# Patient Record
Sex: MALE | URBAN - METROPOLITAN AREA
[De-identification: names, ages, dates, MRNs, and addresses within clinical notes are randomized per-mention and may not be internally consistent; named-entity substitution may affect disease eponyms.]

---

## 2024-02-20 ENCOUNTER — OFFICE VISIT (OUTPATIENT)
Age: 19
End: 2024-02-20
Payer: COMMERCIAL

## 2024-02-20 ENCOUNTER — PREP FOR PROCEDURE (OUTPATIENT)
Age: 19
End: 2024-02-20

## 2024-02-20 VITALS
TEMPERATURE: 98.1 F | HEIGHT: 71 IN | RESPIRATION RATE: 18 BRPM | DIASTOLIC BLOOD PRESSURE: 72 MMHG | OXYGEN SATURATION: 97 % | WEIGHT: 217.6 LBS | HEART RATE: 95 BPM | BODY MASS INDEX: 30.46 KG/M2 | SYSTOLIC BLOOD PRESSURE: 133 MMHG

## 2024-02-20 DIAGNOSIS — N50.89 PERINEAL MASS IN MALE: Primary | ICD-10-CM

## 2024-02-20 DIAGNOSIS — N50.89 PERINEAL MASS, MALE: ICD-10-CM

## 2024-02-20 PROCEDURE — 99203 OFFICE O/P NEW LOW 30 MIN: CPT | Performed by: SURGERY

## 2024-02-20 ASSESSMENT — PATIENT HEALTH QUESTIONNAIRE - PHQ9
SUM OF ALL RESPONSES TO PHQ QUESTIONS 1-9: 0
SUM OF ALL RESPONSES TO PHQ9 QUESTIONS 1 & 2: 0
1. LITTLE INTEREST OR PLEASURE IN DOING THINGS: 0
2. FEELING DOWN, DEPRESSED OR HOPELESS: 0

## 2024-02-20 NOTE — PROGRESS NOTES
Subjective:       Tenzin Wilkinson is a 19 y.o. male who presents for evaluate a lump in his right groin. He has discomfort when he moves. He has had the pain since last June. He only recently noticed it. [facility] He was referred to urology and was referred here.     2/12/24 CT Pelvis with IV contrast:  3.4 cm right anterior perineum subcutaneous fat  Past Medical History:   Diagnosis Date    Cystic acne     Pt went through Accutane    Environmental allergies      Past Surgical History:   Procedure Laterality Date    WISDOM TOOTH EXTRACTION       Social History     Socioeconomic History    Marital status: Unknown     Spouse name: None    Number of children: None    Years of education: None    Highest education level: None   Tobacco Use    Smoking status: Never     Passive exposure: Never    Smokeless tobacco: Never   Vaping Use    Vaping Use: Never used   Substance and Sexual Activity    Alcohol use: Never    Drug use: Never     No current outpatient medications on file.     No current facility-administered medications for this visit.     No Known Allergies    Review of Systems  Pertinent items are noted in HPI.      Objective:      /72 (Site: Left Upper Arm, Position: Sitting, Cuff Size: Small Adult)   Pulse 95   Temp 98.1 °F (36.7 °C) (Temporal)   Resp 18   Ht 1.803 m (5' 11\")   Wt 98.7 kg (217 lb 9.6 oz)   SpO2 97%   BMI 30.35 kg/m²     Physical Exam:  General:  Alert, cooperative, no distress, appears stated age.   Eyes:  Conjunctivae/corneas clear.    Ears:  Normal external ear canals both ears.   Nose: Nares normal. Septum midline.    Mouth/Throat: Lips, mucosa, and tongue normal. Teeth and gums normal.   Neck: Supple, symmetrical, trachea midline   Lungs:   Clear to auscultation bilaterally. No respiratory distress   Heart:  Regular rate and rhythm   Abdomen:   Soft, non-tender. Bowel sounds normal. No masses,  No organomegaly.   Extremities: Extremities normal, atraumatic, no cyanosis

## 2024-02-20 NOTE — PROGRESS NOTES
Identified pt with two pt identifiers (name and ). Reviewed chart in preparation for visit and have obtained necessary documentation.    Tenzin Wilkinson is a 19 y.o. male  Chief Complaint   Patient presents with    New Patient     Seen at request of Dr. Zander Fajardo, eval mass on Perineum      /72 (Site: Left Upper Arm, Position: Sitting, Cuff Size: Small Adult)   Pulse 95   Temp 98.1 °F (36.7 °C) (Temporal)   Resp 18   Ht 1.803 m (5' 11\")   Wt 98.7 kg (217 lb 9.6 oz)   SpO2 97%   BMI 30.35 kg/m²     1. Have you been to the ER, urgent care clinic since your last visit?  Hospitalized since your last visit?no    2. Have you seen or consulted any other health care providers outside of the [facility] since your last visit?  Include any pap smears or colon screening. no

## 2024-03-07 NOTE — PROGRESS NOTES
your hair loose or down, no ponytails, buns, александр pins or clips.  All body piercings must be removed.  Please shower with antibacterial soap for three consecutive days before and on the morning of surgery, but do not apply any lotions, powders or deodorants after the shower on the day of surgery. Please use a fresh towels after each shower. Please sleep in clean clothes and change bed linens the night before surgery.  Please do not shave for 48 hours prior to surgery. Shaving of the face is acceptable.    7. You should understand that if you do not follow these instructions your surgery may be cancelled.  If your physical condition changes (I.e. fever, cold or flu) please contact your surgeon as soon as possible.    8. It is important that you be on time.  If a situation occurs where you may be late, please call [phone] (OR Holding Area).    9. If you have any questions and or problems, please call [phone] (Pre-admission Testing).    10. Your surgery time may be subject to change.  You will receive a phone call the evening prior if your time changes.    11.  If having outpatient surgery, you must have someone to drive you here, stay with you during the duration of your stay, and to drive you home at time of discharge.       TAKE ALL MEDICATIONS DAY OF SURGERY EXCEPT: none      I understand a pre-operative phone call will be made to verify my surgery time.  In the event that I am not available, I give permission for a message to be left on my answering service and/or with another person?  yes         ___________________      __________   _________    (Signature of Patient)             (Witness)                (Date and Time)

## 2024-03-11 ENCOUNTER — ANESTHESIA EVENT (OUTPATIENT)
Facility: HOSPITAL | Age: 19
End: 2024-03-11
Payer: COMMERCIAL

## 2024-03-11 ENCOUNTER — ANESTHESIA (OUTPATIENT)
Facility: HOSPITAL | Age: 19
End: 2024-03-11
Payer: COMMERCIAL

## 2024-03-11 ENCOUNTER — HOSPITAL ENCOUNTER (OUTPATIENT)
Facility: HOSPITAL | Age: 19
Setting detail: OUTPATIENT SURGERY
Discharge: HOME | End: 2024-03-11
Attending: SURGERY | Admitting: SURGERY
Payer: COMMERCIAL

## 2024-03-11 VITALS
WEIGHT: 214.51 LBS | SYSTOLIC BLOOD PRESSURE: 119 MMHG | HEART RATE: 71 BPM | DIASTOLIC BLOOD PRESSURE: 66 MMHG | RESPIRATION RATE: 15 BRPM | OXYGEN SATURATION: 97 % | HEIGHT: 71 IN | TEMPERATURE: 98 F | BODY MASS INDEX: 30.03 KG/M2

## 2024-03-11 DIAGNOSIS — N50.89 PERINEAL MASS, MALE: Primary | ICD-10-CM

## 2024-03-11 PROCEDURE — 7100000001 HC PACU RECOVERY - ADDTL 15 MIN: Performed by: SURGERY

## 2024-03-11 PROCEDURE — 3600000012 HC SURGERY LEVEL 2 ADDTL 15MIN: Performed by: SURGERY

## 2024-03-11 PROCEDURE — 27043 EXC HIP PELVIS LES SC 3 CM/>: CPT | Performed by: SURGERY

## 2024-03-11 PROCEDURE — 6370000000 HC RX 637 (ALT 250 FOR IP): Performed by: ANESTHESIOLOGY

## 2024-03-11 PROCEDURE — 7100000000 HC PACU RECOVERY - FIRST 15 MIN: Performed by: SURGERY

## 2024-03-11 PROCEDURE — 88185 FLOWCYTOMETRY/TC ADD-ON: CPT

## 2024-03-11 PROCEDURE — 6360000002 HC RX W HCPCS: Performed by: SURGERY

## 2024-03-11 PROCEDURE — 88305 TISSUE EXAM BY PATHOLOGIST: CPT

## 2024-03-11 PROCEDURE — 2709999900 HC NON-CHARGEABLE SUPPLY: Performed by: SURGERY

## 2024-03-11 PROCEDURE — 3700000000 HC ANESTHESIA ATTENDED CARE: Performed by: SURGERY

## 2024-03-11 PROCEDURE — 2580000003 HC RX 258: Performed by: STUDENT IN AN ORGANIZED HEALTH CARE EDUCATION/TRAINING PROGRAM

## 2024-03-11 PROCEDURE — 3700000001 HC ADD 15 MINUTES (ANESTHESIA): Performed by: SURGERY

## 2024-03-11 PROCEDURE — 2580000003 HC RX 258: Performed by: SURGERY

## 2024-03-11 PROCEDURE — 88342 IMHCHEM/IMCYTCHM 1ST ANTB: CPT

## 2024-03-11 PROCEDURE — 2500000003 HC RX 250 WO HCPCS

## 2024-03-11 PROCEDURE — 88184 FLOWCYTOMETRY/ TC 1 MARKER: CPT

## 2024-03-11 PROCEDURE — 3600000002 HC SURGERY LEVEL 2 BASE: Performed by: SURGERY

## 2024-03-11 PROCEDURE — 6360000002 HC RX W HCPCS

## 2024-03-11 PROCEDURE — 88341 IMHCHEM/IMCYTCHM EA ADD ANTB: CPT

## 2024-03-11 RX ORDER — ONDANSETRON 2 MG/ML
4 INJECTION INTRAMUSCULAR; INTRAVENOUS ONCE
Status: DISCONTINUED | OUTPATIENT
Start: 2024-03-11 | End: 2024-03-11 | Stop reason: HOSPADM

## 2024-03-11 RX ORDER — SODIUM CHLORIDE 0.9 % (FLUSH) 0.9 %
5-40 SYRINGE (ML) INJECTION EVERY 12 HOURS SCHEDULED
Status: DISCONTINUED | OUTPATIENT
Start: 2024-03-11 | End: 2024-03-11 | Stop reason: HOSPADM

## 2024-03-11 RX ORDER — PROCHLORPERAZINE EDISYLATE 5 MG/ML
5 INJECTION INTRAMUSCULAR; INTRAVENOUS
Status: DISCONTINUED | OUTPATIENT
Start: 2024-03-11 | End: 2024-03-11 | Stop reason: HOSPADM

## 2024-03-11 RX ORDER — MIDAZOLAM HYDROCHLORIDE 1 MG/ML
INJECTION INTRAMUSCULAR; INTRAVENOUS PRN
Status: DISCONTINUED | OUTPATIENT
Start: 2024-03-11 | End: 2024-03-11 | Stop reason: SDUPTHER

## 2024-03-11 RX ORDER — SODIUM CHLORIDE 0.9 % (FLUSH) 0.9 %
5-40 SYRINGE (ML) INJECTION PRN
Status: DISCONTINUED | OUTPATIENT
Start: 2024-03-11 | End: 2024-03-11 | Stop reason: HOSPADM

## 2024-03-11 RX ORDER — OXYCODONE HYDROCHLORIDE 5 MG/1
5 TABLET ORAL
Status: DISCONTINUED | OUTPATIENT
Start: 2024-03-11 | End: 2024-03-11 | Stop reason: HOSPADM

## 2024-03-11 RX ORDER — SODIUM CHLORIDE, SODIUM LACTATE, POTASSIUM CHLORIDE, CALCIUM CHLORIDE 600; 310; 30; 20 MG/100ML; MG/100ML; MG/100ML; MG/100ML
INJECTION, SOLUTION INTRAVENOUS CONTINUOUS
Status: DISCONTINUED | OUTPATIENT
Start: 2024-03-11 | End: 2024-03-11 | Stop reason: HOSPADM

## 2024-03-11 RX ORDER — ACETAMINOPHEN 500 MG
1000 TABLET ORAL ONCE
Status: COMPLETED | OUTPATIENT
Start: 2024-03-11 | End: 2024-03-11

## 2024-03-11 RX ORDER — OXYCODONE HYDROCHLORIDE 5 MG/1
5 TABLET ORAL EVERY 6 HOURS PRN
Qty: 12 TABLET | Refills: 0 | Status: SHIPPED | OUTPATIENT
Start: 2024-03-11 | End: 2024-03-14

## 2024-03-11 RX ORDER — SODIUM CHLORIDE 9 MG/ML
INJECTION, SOLUTION INTRAVENOUS PRN
Status: DISCONTINUED | OUTPATIENT
Start: 2024-03-11 | End: 2024-03-11 | Stop reason: HOSPADM

## 2024-03-11 RX ORDER — ONDANSETRON 2 MG/ML
INJECTION INTRAMUSCULAR; INTRAVENOUS PRN
Status: DISCONTINUED | OUTPATIENT
Start: 2024-03-11 | End: 2024-03-11 | Stop reason: SDUPTHER

## 2024-03-11 RX ORDER — DEXAMETHASONE SODIUM PHOSPHATE 4 MG/ML
4 INJECTION, SOLUTION INTRA-ARTICULAR; INTRALESIONAL; INTRAMUSCULAR; INTRAVENOUS; SOFT TISSUE
Status: DISCONTINUED | OUTPATIENT
Start: 2024-03-11 | End: 2024-03-11 | Stop reason: HOSPADM

## 2024-03-11 RX ORDER — LIDOCAINE HYDROCHLORIDE 20 MG/ML
INJECTION, SOLUTION EPIDURAL; INFILTRATION; INTRACAUDAL; PERINEURAL PRN
Status: DISCONTINUED | OUTPATIENT
Start: 2024-03-11 | End: 2024-03-11 | Stop reason: SDUPTHER

## 2024-03-11 RX ORDER — ACETAMINOPHEN 325 MG/1
650 TABLET ORAL
Status: DISCONTINUED | OUTPATIENT
Start: 2024-03-11 | End: 2024-03-11 | Stop reason: HOSPADM

## 2024-03-11 RX ORDER — NALOXONE HYDROCHLORIDE 0.4 MG/ML
INJECTION, SOLUTION INTRAMUSCULAR; INTRAVENOUS; SUBCUTANEOUS PRN
Status: DISCONTINUED | OUTPATIENT
Start: 2024-03-11 | End: 2024-03-11 | Stop reason: HOSPADM

## 2024-03-11 RX ORDER — IBUPROFEN 600 MG/1
600 TABLET ORAL EVERY 6 HOURS PRN
Qty: 30 TABLET | Refills: 0 | Status: SHIPPED | OUTPATIENT
Start: 2024-03-11

## 2024-03-11 RX ORDER — FENTANYL CITRATE 50 UG/ML
INJECTION, SOLUTION INTRAMUSCULAR; INTRAVENOUS PRN
Status: DISCONTINUED | OUTPATIENT
Start: 2024-03-11 | End: 2024-03-11 | Stop reason: SDUPTHER

## 2024-03-11 RX ORDER — HYDRALAZINE HYDROCHLORIDE 20 MG/ML
10 INJECTION INTRAMUSCULAR; INTRAVENOUS
Status: DISCONTINUED | OUTPATIENT
Start: 2024-03-11 | End: 2024-03-11 | Stop reason: HOSPADM

## 2024-03-11 RX ORDER — FENTANYL CITRATE 50 UG/ML
100 INJECTION, SOLUTION INTRAMUSCULAR; INTRAVENOUS
Status: DISCONTINUED | OUTPATIENT
Start: 2024-03-11 | End: 2024-03-11 | Stop reason: HOSPADM

## 2024-03-11 RX ORDER — FENTANYL CITRATE 50 UG/ML
25 INJECTION, SOLUTION INTRAMUSCULAR; INTRAVENOUS EVERY 5 MIN PRN
Status: DISCONTINUED | OUTPATIENT
Start: 2024-03-11 | End: 2024-03-11 | Stop reason: HOSPADM

## 2024-03-11 RX ORDER — DEXMEDETOMIDINE HYDROCHLORIDE 100 UG/ML
INJECTION, SOLUTION INTRAVENOUS PRN
Status: DISCONTINUED | OUTPATIENT
Start: 2024-03-11 | End: 2024-03-11 | Stop reason: SDUPTHER

## 2024-03-11 RX ORDER — DEXAMETHASONE SODIUM PHOSPHATE 4 MG/ML
INJECTION, SOLUTION INTRA-ARTICULAR; INTRALESIONAL; INTRAMUSCULAR; INTRAVENOUS; SOFT TISSUE PRN
Status: DISCONTINUED | OUTPATIENT
Start: 2024-03-11 | End: 2024-03-11 | Stop reason: SDUPTHER

## 2024-03-11 RX ORDER — MIDAZOLAM HYDROCHLORIDE 2 MG/2ML
2 INJECTION, SOLUTION INTRAMUSCULAR; INTRAVENOUS
Status: DISCONTINUED | OUTPATIENT
Start: 2024-03-11 | End: 2024-03-11 | Stop reason: HOSPADM

## 2024-03-11 RX ORDER — HYDROMORPHONE HYDROCHLORIDE 1 MG/ML
0.25 INJECTION, SOLUTION INTRAMUSCULAR; INTRAVENOUS; SUBCUTANEOUS EVERY 5 MIN PRN
Status: DISCONTINUED | OUTPATIENT
Start: 2024-03-11 | End: 2024-03-11 | Stop reason: HOSPADM

## 2024-03-11 RX ORDER — BUPIVACAINE HYDROCHLORIDE 5 MG/ML
INJECTION, SOLUTION EPIDURAL; INTRACAUDAL PRN
Status: DISCONTINUED | OUTPATIENT
Start: 2024-03-11 | End: 2024-03-11 | Stop reason: HOSPADM

## 2024-03-11 RX ADMIN — MIDAZOLAM 3 MG: 1 INJECTION INTRAMUSCULAR; INTRAVENOUS at 10:10

## 2024-03-11 RX ADMIN — MIDAZOLAM 2 MG: 1 INJECTION INTRAMUSCULAR; INTRAVENOUS at 10:05

## 2024-03-11 RX ADMIN — PROPOFOL 200 MG: 10 INJECTION, EMULSION INTRAVENOUS at 10:12

## 2024-03-11 RX ADMIN — DEXMEDETOMIDINE HYDROCHLORIDE 4 MCG: 100 INJECTION, SOLUTION, CONCENTRATE INTRAVENOUS at 10:08

## 2024-03-11 RX ADMIN — ACETAMINOPHEN 1000 MG: 500 TABLET ORAL at 08:26

## 2024-03-11 RX ADMIN — PROPOFOL 100 MCG/KG/MIN: 10 INJECTION, EMULSION INTRAVENOUS at 10:20

## 2024-03-11 RX ADMIN — SODIUM CHLORIDE, POTASSIUM CHLORIDE, SODIUM LACTATE AND CALCIUM CHLORIDE: 600; 310; 30; 20 INJECTION, SOLUTION INTRAVENOUS at 08:55

## 2024-03-11 RX ADMIN — DEXMEDETOMIDINE HYDROCHLORIDE 8 MCG: 100 INJECTION, SOLUTION, CONCENTRATE INTRAVENOUS at 10:12

## 2024-03-11 RX ADMIN — WATER 2000 MG: 1 INJECTION INTRAMUSCULAR; INTRAVENOUS; SUBCUTANEOUS at 10:18

## 2024-03-11 RX ADMIN — DEXMEDETOMIDINE HYDROCHLORIDE 4 MCG: 100 INJECTION, SOLUTION, CONCENTRATE INTRAVENOUS at 10:10

## 2024-03-11 RX ADMIN — LIDOCAINE HYDROCHLORIDE 100 MG: 20 INJECTION, SOLUTION EPIDURAL; INFILTRATION; INTRACAUDAL; PERINEURAL at 10:12

## 2024-03-11 RX ADMIN — DEXMEDETOMIDINE HYDROCHLORIDE 4 MCG: 100 INJECTION, SOLUTION, CONCENTRATE INTRAVENOUS at 10:05

## 2024-03-11 RX ADMIN — DEXAMETHASONE SODIUM PHOSPHATE 4 MG: 4 INJECTION, SOLUTION INTRAMUSCULAR; INTRAVENOUS at 10:17

## 2024-03-11 RX ADMIN — FENTANYL CITRATE 50 MCG: 50 INJECTION, SOLUTION INTRAMUSCULAR; INTRAVENOUS at 10:12

## 2024-03-11 RX ADMIN — ONDANSETRON HYDROCHLORIDE 4 MG: 2 INJECTION, SOLUTION INTRAMUSCULAR; INTRAVENOUS at 10:05

## 2024-03-11 NOTE — DISCHARGE INSTRUCTIONS
Dr. Lobo Discharge Instructions for:  Tenzin Wilkinson    MRN: [id number] : 2005    Admitted: 3/11/2024  Discharged: 3/11/2024       What to do at Home    Recommended diet: Resume your normal diet    Recommended activity: as tolerated    Follow-up with Dr. Lobo in 1-2 weeks.  Call [phone] for an appointment.    Wound Care:  Replace outer gauze with new dry gauze daily and as needed      How to Care for Your Wound      CHECK WOUND APPEARANCE   Some swelling, redness, and pain are common with all wounds and normally will go away as the wound heals. If swelling, redness, or pain increases or if the wound feels warm to the touch, contact a doctor. Also contact a doctor if the wound edges reopen or separate.    REPLACE BANDAGES   If your wound is bandaged, keep the bandage dry.   Replace the dressing daily or if the bandage should become wet, unless otherwise instructed by your physician.      AVOID TOPICAL MEDICATIONS   Do not apply liquid or ointment medications or any other product to your wound    KEEP WOUND DRY AND PROTECTED   You may occasionally and briefly wet your wound in the shower. No baths. Do not soak or scrub your wound, do not swim, and avoid periods of heavy perspiration. After showering, gently blot your wound dry with a soft towel. If a protective dressing is being used, apply a fresh, dry bandage.   Apply a clean, dry bandage over the wound if necessary to protect it.   Protect your wound from injury until the skin has had sufficient time to heal.   Do not scratch, rub, or pick at the wound.    Protect the wound from prolonged exposure to sunlight or tanning lamps.     DISCHARGE SUMMARY from Nurse    PATIENT INSTRUCTIONS:    After general anesthesia or intravenous sedation, for 24 hours or while taking prescription narcotics:    Have someone responsible help you with your care  Limit your activities  Do not drive and operate hazardous machinery  Do not make important personal, legal or

## 2024-03-11 NOTE — INTERVAL H&P NOTE
Update History & Physical    The patient's History and Physical was reviewed with the patient and I examined the patient. There was no change. The surgical site was confirmed by the patient and me.     Plan: The risks, benefits, expected outcome, and alternative to the recommended procedure have been discussed with the patient. Patient understands and wants to proceed with the procedure.     Electronically signed by Monica Lobo MD on 3/11/2024 at 9:18 AM

## 2024-03-11 NOTE — OP NOTE
Operative Note      Patient: Tenzin Wilkinson  YOB: 2005  MRN: [id number]    Date of Procedure: 3/11/2024    Pre-Op Diagnosis Codes:     * Perineal mass, male [N50.89]    Post-Op Diagnosis: Post-Op Diagnosis Codes:     * Perineal mass, male [N50.89]       Procedure(s):  EXCISION OF SOFT TISSUE MASS RIGHT PERINEUM    Surgeon(s):  Monica Lobo MD    Assistant:   Circulator: Nella Steinberg RN  First Assistant: Tamy Reyes RN  Scrub Person First: Kemi Johnson    Anesthesia: General    Estimated Blood Loss (mL): Minimal    Complications: None    Specimens:   ID Type Source Tests Collected by Time Destination   1 : RIGHT PERINEAL SOFT TISSUE MASS R/O LYMPH NODE VS LYMPHOMA LYMPHOMA WORK UP Tissue Lymph Node SURGICAL PATHOLOGY Monica Lobo MD 3/11/2024 1034        Implants:  * No implants in log *      Drains: * No LDAs found *    Findings: 3.4 soft tissue mass in the perineum     Detailed Description of Procedure:   The patient was marked in the preoperative area. The patient was brought to the operating room and placed in supine position. General anesthesia was induced. The area was prepped and draped in the usual sterile fashion. An incision was made overlying the mass. The subcutaneous tissue was dissected down to the soft tissue mass. The soft tissue mass was circumferentially dissected and sent to pathology as a fresh specimen. Hemostasis was achieved. The subcutaneous tissue was reapproximated with 3-0 vicryl. The dermis was reapproximated with 3-0 vicryl. The skin was reapproximated with 2-0 prolene. Sterile dressing was applied. The patient tolerated the procedure well. The patient was extubated and transferred to PACU in stable condition.     Electronically signed by Moinca Lobo MD on 3/11/2024 at 10:45 AM

## 2024-03-12 ENCOUNTER — TELEPHONE (OUTPATIENT)
Age: 19
End: 2024-03-12

## 2024-03-12 DIAGNOSIS — D47.Z2 CASTLEMAN'S DISEASE (HCC): Primary | ICD-10-CM

## 2024-03-13 ENCOUNTER — TELEPHONE (OUTPATIENT)
Age: 19
End: 2024-03-13

## 2024-03-13 DIAGNOSIS — D47.Z2 CASTLEMAN'S DISEASE (HCC): Primary | ICD-10-CM

## 2024-03-13 NOTE — TELEPHONE ENCOUNTER
Patient mother called requesting to speak to md, patient spoke to md yesterday about diagnosis and being referred out, wants clarification on what was being discussed please call back    [phone] zack ramachandran, mother

## 2024-03-13 NOTE — ANESTHESIA POSTPROCEDURE EVALUATION
Department of Anesthesiology  Postprocedure Note    Patient: Tenzin Wilkinson  MRN: [id number]  YOB: 2005  Date of evaluation: 3/13/2024    Procedure Summary       Date: 03/11/24 Room / Location: [facility] OR M1 / [facility] OR    Anesthesia Start: 1005 Anesthesia Stop: 1058    Procedure: EXCISION OF SOFT TISSUE MASS RIGHT PERINEUM (Right: Perineum) Diagnosis:       Perineal mass, male      (Perineal mass, male [N50.89])    Providers: Monica Lobo MD Responsible Provider: David Loo MD    Anesthesia Type: General ASA Status: 2            Anesthesia Type: General    Emily Phase I: Emily Score: 10    Emily Phase II:      Anesthesia Post Evaluation    Patient location during evaluation: PACU  Patient participation: complete - patient participated  Level of consciousness: awake and alert  Airway patency: patent  Nausea & Vomiting: no nausea  Cardiovascular status: hemodynamically stable  Respiratory status: acceptable  Hydration status: euvolemic  Pain management: adequate      No notable events documented.

## 2024-03-13 NOTE — TELEPHONE ENCOUNTER
Pt's mother (anshul) notified Dr. Lobo was not in the office today but a message was sent to the doctor about questions and referral.  At request I read over results.  Dr. Lobo notified about call and referral needed put in.

## 2024-03-15 ENCOUNTER — TELEPHONE (OUTPATIENT)
Age: 19
End: 2024-03-15

## 2024-03-15 NOTE — TELEPHONE ENCOUNTER
Patients mother called in regards to referral. I informed her that it was sent to Dr. Figueroa and his staff. I also told her that I would put a message in to the referral coordinator to let her know that she would like an update.    # [phone]

## 2024-03-20 ENCOUNTER — OFFICE VISIT (OUTPATIENT)
Age: 19
End: 2024-03-20
Payer: COMMERCIAL

## 2024-03-20 VITALS
SYSTOLIC BLOOD PRESSURE: 147 MMHG | TEMPERATURE: 98 F | BODY MASS INDEX: 30.52 KG/M2 | HEIGHT: 71 IN | DIASTOLIC BLOOD PRESSURE: 89 MMHG | HEART RATE: 89 BPM | OXYGEN SATURATION: 100 % | WEIGHT: 218 LBS | RESPIRATION RATE: 20 BRPM

## 2024-03-20 DIAGNOSIS — D47.Z2 CASTLEMAN'S DISEASE (HCC): Primary | ICD-10-CM

## 2024-03-20 DIAGNOSIS — D47.Z2 CASTLEMAN'S DISEASE (HCC): ICD-10-CM

## 2024-03-20 PROCEDURE — 99205 OFFICE O/P NEW HI 60 MIN: CPT | Performed by: STUDENT IN AN ORGANIZED HEALTH CARE EDUCATION/TRAINING PROGRAM

## 2024-03-20 NOTE — PROGRESS NOTES
Tenzin Wilkinson is a 19 y.o. male who presents for follow up of   Chief Complaint   Patient presents with    New Patient     Perineal mass       Mr. Wilkinson is here for an evaluation of a perineal mass. He reports having the mass surgically removed and has been dealing with a little soreness getting back to moving around more. He denies any pain, headaches, abnormal bleeding or numbness and tingling. He has no other concerns at this time.      Med's were reviewed with patient and updated.   
RheumAssure    Tamara Barr Virus (EBV) Antibody Panel I    CBC with Auto Differential    Lactate Dehydrogenase    Comprehensive Metabolic Panel    Sedimentation Rate               Return in about 3 months (around 6/20/2024) for needs labs just prior to return visit, image review.    Signed By: Jing Figueroa MD      Attending Medical Oncologist   [facility]        
Opt out

## 2024-03-21 LAB
ALBUMIN SERPL-MCNC: 4.1 G/DL (ref 3.5–5)
ALBUMIN/GLOB SERPL: 1.3 (ref 1.1–2.2)
ALP SERPL-CCNC: 88 U/L (ref 45–117)
ALT SERPL-CCNC: 138 U/L (ref 12–78)
ANION GAP SERPL CALC-SCNC: 8 MMOL/L (ref 5–15)
AST SERPL-CCNC: 29 U/L (ref 15–37)
BASOPHILS # BLD: 0 K/UL (ref 0–0.1)
BASOPHILS NFR BLD: 1 % (ref 0–1)
BILIRUB SERPL-MCNC: 0.3 MG/DL (ref 0.2–1)
BUN SERPL-MCNC: 16 MG/DL (ref 6–20)
BUN/CREAT SERPL: 15 (ref 12–20)
CALCIUM SERPL-MCNC: 9.5 MG/DL (ref 8.5–10.1)
CHLORIDE SERPL-SCNC: 103 MMOL/L (ref 97–108)
CO2 SERPL-SCNC: 26 MMOL/L (ref 21–32)
CREAT SERPL-MCNC: 1.08 MG/DL (ref 0.7–1.3)
CRP SERPL-MCNC: <0.29 MG/DL (ref 0–0.3)
DIFFERENTIAL METHOD BLD: ABNORMAL
EOSINOPHIL # BLD: 0.1 K/UL (ref 0–0.4)
EOSINOPHIL NFR BLD: 1 % (ref 0–7)
ERYTHROCYTE [DISTWIDTH] IN BLOOD BY AUTOMATED COUNT: 15.2 % (ref 11.5–14.5)
GLOBULIN SER CALC-MCNC: 3.1 G/DL (ref 2–4)
GLUCOSE SERPL-MCNC: 99 MG/DL (ref 65–100)
HCT VFR BLD AUTO: 43.9 % (ref 36.6–50.3)
HGB BLD-MCNC: 14.3 G/DL (ref 12.1–17)
HIV 1+2 AB+HIV1 P24 AG SERPL QL IA: NONREACTIVE
HIV 1/2 RESULT COMMENT: NORMAL
IMM GRANULOCYTES # BLD AUTO: 0 K/UL (ref 0–0.04)
IMM GRANULOCYTES NFR BLD AUTO: 1 % (ref 0–0.5)
LDH SERPL L TO P-CCNC: 161 U/L (ref 85–241)
LYMPHOCYTES # BLD: 1.9 K/UL (ref 0.8–3.5)
LYMPHOCYTES NFR BLD: 34 % (ref 12–49)
MCH RBC QN AUTO: 23.8 PG (ref 26–34)
MCHC RBC AUTO-ENTMCNC: 32.6 G/DL (ref 30–36.5)
MCV RBC AUTO: 72.9 FL (ref 80–99)
MONOCYTES # BLD: 0.4 K/UL (ref 0–1)
MONOCYTES NFR BLD: 7 % (ref 5–13)
NEUTS SEG # BLD: 3.2 K/UL (ref 1.8–8)
NEUTS SEG NFR BLD: 56 % (ref 32–75)
NRBC # BLD: 0 K/UL (ref 0–0.01)
NRBC BLD-RTO: 0 PER 100 WBC
PLATELET # BLD AUTO: 350 K/UL (ref 150–400)
PMV BLD AUTO: 12 FL (ref 8.9–12.9)
POTASSIUM SERPL-SCNC: 4.5 MMOL/L (ref 3.5–5.1)
PROT SERPL-MCNC: 7.2 G/DL (ref 6.4–8.2)
RBC # BLD AUTO: 6.02 M/UL (ref 4.1–5.7)
SODIUM SERPL-SCNC: 137 MMOL/L (ref 136–145)
WBC # BLD AUTO: 5.6 K/UL (ref 4.1–11.1)

## 2024-03-22 ENCOUNTER — TELEPHONE (OUTPATIENT)
Age: 19
End: 2024-03-22

## 2024-03-22 LAB
EBV INTERPRETATION: ABNORMAL
EBV NA IGG SER-ACNC: 20.4 U/ML (ref 0–17.9)
EBV VCA IGG SER-ACNC: 129 U/ML (ref 0–17.9)
EBV VCA IGM SER-ACNC: <36 U/ML (ref 0–35.9)

## 2024-03-23 LAB
ANA TITR SER IF: NEGATIVE
LABORATORY COMMENT REPORT: NORMAL

## 2024-03-26 ENCOUNTER — OFFICE VISIT (OUTPATIENT)
Age: 19
End: 2024-03-26

## 2024-03-26 VITALS
TEMPERATURE: 97.3 F | OXYGEN SATURATION: 97 % | BODY MASS INDEX: 30.63 KG/M2 | HEIGHT: 71 IN | WEIGHT: 218.8 LBS | DIASTOLIC BLOOD PRESSURE: 87 MMHG | RESPIRATION RATE: 20 BRPM | SYSTOLIC BLOOD PRESSURE: 126 MMHG | HEART RATE: 90 BPM

## 2024-03-26 DIAGNOSIS — D47.Z2 CASTLEMAN'S DISEASE (HCC): Primary | ICD-10-CM

## 2024-03-26 LAB
ALBUMIN SERPL ELPH-MCNC: 3.9 G/DL (ref 2.9–4.4)
ALBUMIN/GLOB SERPL: 1.3 (ref 0.7–1.7)
ALPHA1 GLOB SERPL ELPH-MCNC: 0.2 G/DL (ref 0–0.4)
ALPHA2 GLOB SERPL ELPH-MCNC: 0.8 G/DL (ref 0.4–1)
B-GLOBULIN SERPL ELPH-MCNC: 1.1 G/DL (ref 0.7–1.3)
GAMMA GLOB SERPL ELPH-MCNC: 1 G/DL (ref 0.4–1.8)
GLOBULIN SER-MCNC: 3.2 G/DL (ref 2.2–3.9)
IGA SERPL-MCNC: 148 MG/DL (ref 90–386)
IGG SERPL-MCNC: 1009 MG/DL (ref 603–1613)
IGM SERPL-MCNC: 87 MG/DL (ref 20–172)
IL6 SERPL-MCNC: <2.5 PG/ML (ref 0–13)
INTERPRETATION SERPL IEP-IMP: ABNORMAL
KAPPA LC FREE SER-MCNC: 19.6 MG/L (ref 3.3–19.4)
KAPPA LC FREE/LAMBDA FREE SER: 2.09 (ref 0.26–1.65)
LAMBDA LC FREE SERPL-MCNC: 9.4 MG/L (ref 5.7–26.3)
M PROTEIN SERPL ELPH-MCNC: ABNORMAL G/DL
PROT SERPL-MCNC: 7.1 G/DL (ref 6–8.5)

## 2024-03-26 PROCEDURE — 99024 POSTOP FOLLOW-UP VISIT: CPT | Performed by: SURGERY

## 2024-03-26 NOTE — PROGRESS NOTES
Identified pt with two pt identifiers (name and ). Reviewed chart in preparation for visit and have obtained necessary documentation.    Tenzin Wilkinson is a 19 y.o. male  Chief Complaint   Patient presents with    Post-Op Check     S/P 2 week excision of soft tissue mass     /87 (Site: Right Upper Arm, Position: Sitting, Cuff Size: Large Adult)   Pulse 90   Temp 97.3 °F (36.3 °C) (Temporal)   Resp 20   Ht 1.803 m (5' 11\")   Wt 99.2 kg (218 lb 12.8 oz)   SpO2 97%   BMI 30.52 kg/m²     1. Have you been to the ER, urgent care clinic since your last visit?  Hospitalized since your last visit?no    2. Have you seen or consulted any other health care providers outside of the [facility] since your last visit?  Include any pap smears or colon screening. no

## 2024-03-26 NOTE — PROGRESS NOTES
SUBJECTIVE: Tenzin Wilkinson is a 19 y.o. male is seen for a routine postop check s/p perineal l ymph node biopsy. Reports no problems with the wound or other issues.  Activity, diet and bowels are normal. No pain. No fevers    OBJECTIVE:   Vitals:    03/26/24 1144   BP: 126/87   Pulse: 90   Resp: 20   Temp: 97.3 °F (36.3 °C)   SpO2: 97%       General: Appears well.   Incision: healing well, no drainage, no erythema, no seroma, incision well approximated, no swelling    ASSESSMENT: normal postoperative course, doing well.    PLAN:   Wound care discussed  Sutures removed  Follow up with Dr. Figueroa to continue work up for Castlemans disease

## 2024-03-27 LAB
14-3-3 ETA AG SER IA-MCNC: <0.2 NG/ML
CCP IGA+IGG SERPL IA-ACNC: <20 UNITS
RHEUMATOID FACT SERPL-ACNC: <14 UNITS/ML

## 2024-06-14 ENCOUNTER — TELEPHONE (OUTPATIENT)
Age: 19
End: 2024-06-14

## 2024-06-27 ENCOUNTER — HOSPITAL ENCOUNTER (OUTPATIENT)
Facility: HOSPITAL | Age: 19
Discharge: HOME | End: 2024-06-27
Attending: STUDENT IN AN ORGANIZED HEALTH CARE EDUCATION/TRAINING PROGRAM
Payer: COMMERCIAL

## 2024-06-27 ENCOUNTER — HOSPITAL ENCOUNTER (OUTPATIENT)
Facility: HOSPITAL | Age: 19
End: 2024-06-27
Attending: STUDENT IN AN ORGANIZED HEALTH CARE EDUCATION/TRAINING PROGRAM
Payer: COMMERCIAL

## 2024-06-27 DIAGNOSIS — D47.Z2 CASTLEMAN'S DISEASE (HCC): ICD-10-CM

## 2024-06-27 LAB
GLUCOSE BLD STRIP.AUTO-MCNC: 97 MG/DL (ref 65–117)
SERVICE CMNT-IMP: NORMAL

## 2024-06-27 PROCEDURE — 3430000000 HC RX DIAGNOSTIC RADIOPHARMACEUTICAL: Performed by: STUDENT IN AN ORGANIZED HEALTH CARE EDUCATION/TRAINING PROGRAM

## 2024-06-27 PROCEDURE — 82962 GLUCOSE BLOOD TEST: CPT

## 2024-06-27 PROCEDURE — A9609 HC RX DIAGNOSTIC RADIOPHARMACEUTICAL: HCPCS | Performed by: STUDENT IN AN ORGANIZED HEALTH CARE EDUCATION/TRAINING PROGRAM

## 2024-06-27 PROCEDURE — 78815 PET IMAGE W/CT SKULL-THIGH: CPT

## 2024-06-27 RX ORDER — FLUDEOXYGLUCOSE F-18 500 MCI/ML
10 INJECTION INTRAVENOUS ONCE
Status: COMPLETED | OUTPATIENT
Start: 2024-06-27 | End: 2024-06-27

## 2024-06-27 RX ADMIN — FLUDEOXYGLUCOSE F-18 10.9 MILLICURIE: 500 INJECTION INTRAVENOUS at 11:39

## 2024-07-01 DIAGNOSIS — D47.Z2 CASTLEMAN'S DISEASE (HCC): ICD-10-CM

## 2024-07-01 LAB
ALBUMIN SERPL-MCNC: 4 G/DL (ref 3.5–5)
ALBUMIN/GLOB SERPL: 1.3 (ref 1.1–2.2)
ALP SERPL-CCNC: 88 U/L (ref 45–117)
ALT SERPL-CCNC: 66 U/L (ref 12–78)
ANION GAP SERPL CALC-SCNC: 6 MMOL/L (ref 5–15)
AST SERPL-CCNC: 28 U/L (ref 15–37)
BASOPHILS # BLD: 0 K/UL (ref 0–0.1)
BASOPHILS NFR BLD: 1 % (ref 0–1)
BILIRUB SERPL-MCNC: 0.4 MG/DL (ref 0.2–1)
BUN SERPL-MCNC: 13 MG/DL (ref 6–20)
BUN/CREAT SERPL: 10 (ref 12–20)
CALCIUM SERPL-MCNC: 9.3 MG/DL (ref 8.5–10.1)
CHLORIDE SERPL-SCNC: 107 MMOL/L (ref 97–108)
CO2 SERPL-SCNC: 26 MMOL/L (ref 21–32)
CREAT SERPL-MCNC: 1.26 MG/DL (ref 0.7–1.3)
DIFFERENTIAL METHOD BLD: ABNORMAL
EOSINOPHIL # BLD: 0.1 K/UL (ref 0–0.4)
EOSINOPHIL NFR BLD: 2 % (ref 0–7)
ERYTHROCYTE [DISTWIDTH] IN BLOOD BY AUTOMATED COUNT: 15.4 % (ref 11.5–14.5)
ERYTHROCYTE [SEDIMENTATION RATE] IN BLOOD: 5 MM/HR (ref 0–15)
GLOBULIN SER CALC-MCNC: 3.2 G/DL (ref 2–4)
GLUCOSE SERPL-MCNC: 105 MG/DL (ref 65–100)
HCT VFR BLD AUTO: 44.2 % (ref 36.6–50.3)
HGB BLD-MCNC: 14 G/DL (ref 12.1–17)
IMM GRANULOCYTES # BLD AUTO: 0 K/UL (ref 0–0.04)
IMM GRANULOCYTES NFR BLD AUTO: 0 % (ref 0–0.5)
LDH SERPL L TO P-CCNC: 152 U/L (ref 85–241)
LYMPHOCYTES # BLD: 2.2 K/UL (ref 0.8–3.5)
LYMPHOCYTES NFR BLD: 45 % (ref 12–49)
MCH RBC QN AUTO: 24 PG (ref 26–34)
MCHC RBC AUTO-ENTMCNC: 31.7 G/DL (ref 30–36.5)
MCV RBC AUTO: 75.8 FL (ref 80–99)
MONOCYTES # BLD: 0.4 K/UL (ref 0–1)
MONOCYTES NFR BLD: 7 % (ref 5–13)
NEUTS SEG # BLD: 2.2 K/UL (ref 1.8–8)
NEUTS SEG NFR BLD: 45 % (ref 32–75)
NRBC # BLD: 0 K/UL (ref 0–0.01)
NRBC BLD-RTO: 0 PER 100 WBC
PLATELET # BLD AUTO: 297 K/UL (ref 150–400)
PMV BLD AUTO: 11.2 FL (ref 8.9–12.9)
POTASSIUM SERPL-SCNC: 4.3 MMOL/L (ref 3.5–5.1)
PROT SERPL-MCNC: 7.2 G/DL (ref 6.4–8.2)
RBC # BLD AUTO: 5.83 M/UL (ref 4.1–5.7)
SODIUM SERPL-SCNC: 139 MMOL/L (ref 136–145)
WBC # BLD AUTO: 5 K/UL (ref 4.1–11.1)

## 2024-07-10 ENCOUNTER — OFFICE VISIT (OUTPATIENT)
Age: 19
End: 2024-07-10
Payer: COMMERCIAL

## 2024-07-10 VITALS
TEMPERATURE: 98.2 F | DIASTOLIC BLOOD PRESSURE: 81 MMHG | HEART RATE: 84 BPM | HEIGHT: 71 IN | RESPIRATION RATE: 20 BRPM | WEIGHT: 206 LBS | BODY MASS INDEX: 28.84 KG/M2 | SYSTOLIC BLOOD PRESSURE: 121 MMHG | OXYGEN SATURATION: 99 %

## 2024-07-10 DIAGNOSIS — R71.8 MICROCYTOSIS: ICD-10-CM

## 2024-07-10 DIAGNOSIS — D47.Z2 CASTLEMAN'S DISEASE (HCC): Primary | ICD-10-CM

## 2024-07-10 PROCEDURE — 99214 OFFICE O/P EST MOD 30 MIN: CPT | Performed by: STUDENT IN AN ORGANIZED HEALTH CARE EDUCATION/TRAINING PROGRAM

## 2024-07-10 NOTE — PROGRESS NOTES
Tenzin Wilkinson is a 19 y.o. male who presents for follow up of   Chief Complaint   Patient presents with    Follow-up     Castleman's disease       The patient reports no new clinical symptoms or new complaints since last clinic evaluation.       No interval hospitalizations reported    No interval surgery or procedures reported    No reported new medication changes reported       Medications reviewed with the patient, and chart updated to reflect changes.        
imaging is performed from the skull vertex to mid  thigh and axial, sagittal and coronal images were acquired. Unenhanced CT is  obtained for anatomic localization, and attenuation correction of the PET scan.  Patient preprocedure blood glucose level: 97 mg/dL.    CORRELATIVE IMAGING STUDIES: None.    PRIOR PET: None    HISTORY: The study is requested for staging unicentric Castleman's disease.    FINDINGS:    HEAD/NECK: No apparent foci of abnormal hypermetabolism. Cerebral evaluation is  limited by normal intense activity.    CHEST: There is slight increased tracer activity in the anterior mediastinum  corresponding to soft tissue density, maximum SUV 3.1. Given the patient's age  this could be residual thymic tissue.    ABDOMEN/PELVIS: No foci of abnormal hypermetabolism.    SKELETON: No foci of abnormal hypermetabolism in the axial and visualized  appendicular skeleton.    Impression  Slight increase tracer activity in the anterior mediastinum  corresponds to soft tissue density which given the patient's age could represent  residual thymic tissue. Otherwise normal tracer distribution.      Electronically signed by RODNEY WING      - no symptoms, will trend with repeat PET scan in 6 months      Orders Placed This Visit:     Orders Placed This Encounter   Procedures    PET CT SKULL BASE TO MID THIGH    CBC with Auto Differential    Comprehensive Metabolic Panel    Iron and TIBC    Ferritin               Return in about 6 months (around 1/10/2025) for image review.    Signed By: Jing Figueroa MD      Attending Medical Oncologist   [facility]

## 2024-08-16 ENCOUNTER — TELEPHONE (OUTPATIENT)
Age: 19
End: 2024-08-16

## 2024-08-16 NOTE — TELEPHONE ENCOUNTER
TC with patient's mother (Adelita). Stated son is a patient of Dr. Figueroa. Adelita stated son has Covid and wants to know if pt qualifies for Paxlovid. Stated pt has a bad fever and experiences chills. Adelita stated patient has Castleman's disease, a pre-existing condition, and wants to know if that makes pt qualify for Paxlovid. Please advise.    #[phone]

## 2025-01-08 DIAGNOSIS — R71.8 MICROCYTOSIS: ICD-10-CM

## 2025-01-08 DIAGNOSIS — D47.Z2 CASTLEMAN'S DISEASE (HCC): ICD-10-CM

## 2025-01-08 LAB
ALBUMIN SERPL-MCNC: 3.9 G/DL (ref 3.5–5)
ALBUMIN/GLOB SERPL: 1.2 (ref 1.1–2.2)
ALP SERPL-CCNC: 72 U/L (ref 45–117)
ALT SERPL-CCNC: 38 U/L (ref 12–78)
ANION GAP SERPL CALC-SCNC: 5 MMOL/L (ref 2–12)
AST SERPL-CCNC: 14 U/L (ref 15–37)
BASOPHILS # BLD: 0.03 K/UL (ref 0–0.1)
BASOPHILS NFR BLD: 0.5 % (ref 0–1)
BILIRUB SERPL-MCNC: 0.6 MG/DL (ref 0.2–1)
BUN SERPL-MCNC: 14 MG/DL (ref 6–20)
BUN/CREAT SERPL: 11 (ref 12–20)
CALCIUM SERPL-MCNC: 9.1 MG/DL (ref 8.5–10.1)
CHLORIDE SERPL-SCNC: 104 MMOL/L (ref 97–108)
CO2 SERPL-SCNC: 27 MMOL/L (ref 21–32)
CREAT SERPL-MCNC: 1.33 MG/DL (ref 0.7–1.3)
DIFFERENTIAL METHOD BLD: ABNORMAL
EOSINOPHIL # BLD: 0.14 K/UL (ref 0–0.4)
EOSINOPHIL NFR BLD: 2.4 % (ref 0–7)
ERYTHROCYTE [DISTWIDTH] IN BLOOD BY AUTOMATED COUNT: 14.1 % (ref 11.5–14.5)
FERRITIN SERPL-MCNC: 7 NG/ML (ref 26–388)
GLOBULIN SER CALC-MCNC: 3.3 G/DL (ref 2–4)
GLUCOSE SERPL-MCNC: 96 MG/DL (ref 65–100)
HCT VFR BLD AUTO: 44.4 % (ref 36.6–50.3)
HGB BLD-MCNC: 14.3 G/DL (ref 12.1–17)
IMM GRANULOCYTES # BLD AUTO: 0.01 K/UL (ref 0–0.04)
IMM GRANULOCYTES NFR BLD AUTO: 0.2 % (ref 0–0.5)
IRON SATN MFR SERPL: 26 % (ref 20–50)
IRON SERPL-MCNC: 119 UG/DL (ref 35–150)
LYMPHOCYTES # BLD: 2.79 K/UL (ref 0.8–3.5)
LYMPHOCYTES NFR BLD: 47.2 % (ref 12–49)
MCH RBC QN AUTO: 25.7 PG (ref 26–34)
MCHC RBC AUTO-ENTMCNC: 32.2 G/DL (ref 30–36.5)
MCV RBC AUTO: 79.9 FL (ref 80–99)
MONOCYTES # BLD: 0.46 K/UL (ref 0–1)
MONOCYTES NFR BLD: 7.8 % (ref 5–13)
NEUTS SEG # BLD: 2.48 K/UL (ref 1.8–8)
NEUTS SEG NFR BLD: 41.9 % (ref 32–75)
NRBC # BLD: 0 K/UL (ref 0–0.01)
NRBC BLD-RTO: 0 PER 100 WBC
PLATELET # BLD AUTO: 333 K/UL (ref 150–400)
PMV BLD AUTO: 11 FL (ref 8.9–12.9)
POTASSIUM SERPL-SCNC: 4 MMOL/L (ref 3.5–5.1)
PROT SERPL-MCNC: 7.2 G/DL (ref 6.4–8.2)
RBC # BLD AUTO: 5.56 M/UL (ref 4.1–5.7)
SODIUM SERPL-SCNC: 136 MMOL/L (ref 136–145)
TIBC SERPL-MCNC: 466 UG/DL (ref 250–450)
WBC # BLD AUTO: 5.9 K/UL (ref 4.1–11.1)

## 2025-01-09 ENCOUNTER — HOSPITAL ENCOUNTER (OUTPATIENT)
Facility: HOSPITAL | Age: 20
Discharge: HOME | End: 2025-01-12
Payer: COMMERCIAL

## 2025-01-09 VITALS — BODY MASS INDEX: 28.45 KG/M2 | WEIGHT: 204 LBS

## 2025-01-09 DIAGNOSIS — D47.Z2 CASTLEMAN'S DISEASE (HCC): ICD-10-CM

## 2025-01-09 LAB
GLUCOSE BLD STRIP.AUTO-MCNC: 96 MG/DL (ref 65–117)
SERVICE CMNT-IMP: NORMAL

## 2025-01-09 PROCEDURE — A9609 HC RX DIAGNOSTIC RADIOPHARMACEUTICAL: HCPCS | Performed by: STUDENT IN AN ORGANIZED HEALTH CARE EDUCATION/TRAINING PROGRAM

## 2025-01-09 PROCEDURE — 82962 GLUCOSE BLOOD TEST: CPT

## 2025-01-09 PROCEDURE — 78815 PET IMAGE W/CT SKULL-THIGH: CPT

## 2025-01-09 PROCEDURE — 3430000000 HC RX DIAGNOSTIC RADIOPHARMACEUTICAL: Performed by: STUDENT IN AN ORGANIZED HEALTH CARE EDUCATION/TRAINING PROGRAM

## 2025-01-09 RX ORDER — FLUDEOXYGLUCOSE F-18 500 MCI/ML
10 INJECTION INTRAVENOUS
Status: COMPLETED | OUTPATIENT
Start: 2025-01-09 | End: 2025-01-09

## 2025-01-09 RX ADMIN — FLUDEOXYGLUCOSE F-18 10 MILLICURIE: 500 INJECTION INTRAVENOUS at 07:15

## 2025-01-14 ENCOUNTER — OFFICE VISIT (OUTPATIENT)
Age: 20
End: 2025-01-14
Payer: COMMERCIAL

## 2025-01-14 VITALS
WEIGHT: 208 LBS | HEIGHT: 71 IN | HEART RATE: 88 BPM | OXYGEN SATURATION: 98 % | DIASTOLIC BLOOD PRESSURE: 86 MMHG | SYSTOLIC BLOOD PRESSURE: 134 MMHG | BODY MASS INDEX: 29.12 KG/M2 | TEMPERATURE: 98.6 F | RESPIRATION RATE: 19 BRPM

## 2025-01-14 DIAGNOSIS — J98.59 MEDIASTINAL MASS: ICD-10-CM

## 2025-01-14 DIAGNOSIS — D47.Z2 CASTLEMAN'S DISEASE (HCC): Primary | ICD-10-CM

## 2025-01-14 PROCEDURE — 99214 OFFICE O/P EST MOD 30 MIN: CPT | Performed by: STUDENT IN AN ORGANIZED HEALTH CARE EDUCATION/TRAINING PROGRAM

## 2025-01-14 RX ORDER — M-VIT,TX,IRON,MINS/CALC/FOLIC 27MG-0.4MG
1 TABLET ORAL DAILY
COMMUNITY

## 2025-01-14 NOTE — PROGRESS NOTES
[facility] at [facility]  [id number] [place] [address]  W: [phone] F: [phone]    Reason for Visit:   Tenzin Wilkinson is a 19 y.o. male who is seen in consultation at the request of Dr. Lobo for evaluation of Castleman's Disease.      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 See comments.     Comment     The touch preparation has a homogenous population of small mature   lymphocytes. The histologic section shows an enlarged lymph node with   obliteration of the subcapsular sinuses and a proliferation of large   follicles with involuted germinal centers. Lymphoid follicles have   concentric rings of mantle cell lymphocytes and sclerotic radial blood   vessels. Follicular twinning is also noted. The interfollicular stroma has   a proliferation of high endothelial venules with hyalinized walls.   Lymphocytes are uniformly small mature forms and no Hodgkin Noam-Tracey   cells are identified.     The patient's history of an isolated right groin lump is noted; the   overall findings are compatible with Unicentric Hyaline Vascular   Castleman's Disease.         Initial Presentation  (HPI):     19 year old with relevant medical history of allergies, presents as a referral from general surgery after he underwent excisional  resection of a right perineal soft tissue mass.  He reports a history of a lump in his right groin noted since last June.  CT pelvis with contrast in February 2024 revealed a 3.4 cm right anterior perineal subcutaneous lesions

## 2025-01-14 NOTE — PROGRESS NOTES
Tenzin Wilkinson is a 19 y.o. male who presents for follow up of   Chief Complaint   Patient presents with    Follow-up     Castleman's disease       The patient reports no new clinical symptoms or new complaints since last clinic evaluation.       No interval hospitalizations reported    No interval surgery or procedures reported    No reported new medication changes reported       Medications reviewed with the patient, and chart updated to reflect changes.

## 2025-01-23 PROBLEM — D47.Z2 CASTLEMAN DISEASE (HCC): Status: ACTIVE | Noted: 2025-01-23

## 2025-01-23 ASSESSMENT — ENCOUNTER SYMPTOMS: SHORTNESS OF BREATH: 0

## 2025-01-24 ENCOUNTER — OFFICE VISIT (OUTPATIENT)
Age: 20
End: 2025-01-24
Payer: COMMERCIAL

## 2025-01-24 VITALS
HEART RATE: 87 BPM | WEIGHT: 211 LBS | BODY MASS INDEX: 29.54 KG/M2 | RESPIRATION RATE: 16 BRPM | SYSTOLIC BLOOD PRESSURE: 131 MMHG | DIASTOLIC BLOOD PRESSURE: 89 MMHG | TEMPERATURE: 98.2 F | OXYGEN SATURATION: 97 % | HEIGHT: 71 IN

## 2025-01-24 DIAGNOSIS — J98.59 MEDIASTINAL MASS: ICD-10-CM

## 2025-01-24 DIAGNOSIS — D47.Z2 CASTLEMAN DISEASE (HCC): Primary | ICD-10-CM

## 2025-01-24 PROCEDURE — 99204 OFFICE O/P NEW MOD 45 MIN: CPT | Performed by: STUDENT IN AN ORGANIZED HEALTH CARE EDUCATION/TRAINING PROGRAM

## 2025-01-24 ASSESSMENT — PATIENT HEALTH QUESTIONNAIRE - PHQ9
2. FEELING DOWN, DEPRESSED OR HOPELESS: NOT AT ALL
SUM OF ALL RESPONSES TO PHQ QUESTIONS 1-9: 0
1. LITTLE INTEREST OR PLEASURE IN DOING THINGS: NOT AT ALL
SUM OF ALL RESPONSES TO PHQ QUESTIONS 1-9: 0
SUM OF ALL RESPONSES TO PHQ9 QUESTIONS 1 & 2: 0

## 2025-01-24 NOTE — PROGRESS NOTES
Identified patient with two patient identifiers (name and ). Reviewed chart in preparation for visit and have obtained necessary documentation.    Tenzin Wilkinson is a 19 y.o. male  Chief Complaint   Patient presents with    New Patient     Mass of Mediastinum      /89 (Site: Right Upper Arm, Position: Sitting, Cuff Size: Large Adult)   Pulse 87   Temp 98.2 °F (36.8 °C) (Oral)   Resp 16   Ht 1.803 m (5' 11\")   Wt 95.7 kg (211 lb)   SpO2 97%   BMI 29.43 kg/m²     1. Have you been to the ER, urgent care clinic since your last visit?  Hospitalized since your last visit?no    2. Have you seen or consulted any other health care providers outside of the [facility] since your last visit?  Include any pap smears or colon screening. no   
and documenting clinical information in the electronic health record. The majority of the time was spent obtaining and/or reviewing separately obtained history; a targeted physical exam and evaluation; face to face counseling of the patient, family, and care providers of the proposed treatment/surgery/plan.    Jensen Meza MD  Thoracic Surgeon  [facility] Surgery at [address], [address]  Phone: [phone]  Fax: [phone]

## 2025-02-06 ENCOUNTER — HOSPITAL ENCOUNTER (OUTPATIENT)
Facility: HOSPITAL | Age: 20
Discharge: HOME | End: 2025-02-09
Attending: STUDENT IN AN ORGANIZED HEALTH CARE EDUCATION/TRAINING PROGRAM
Payer: COMMERCIAL

## 2025-02-06 DIAGNOSIS — D47.Z2 CASTLEMAN DISEASE (HCC): ICD-10-CM

## 2025-02-06 PROCEDURE — 6360000004 HC RX CONTRAST MEDICATION: Performed by: STUDENT IN AN ORGANIZED HEALTH CARE EDUCATION/TRAINING PROGRAM

## 2025-02-06 PROCEDURE — 71260 CT THORAX DX C+: CPT

## 2025-02-06 RX ORDER — IOPAMIDOL 755 MG/ML
100 INJECTION, SOLUTION INTRAVASCULAR
Status: COMPLETED | OUTPATIENT
Start: 2025-02-06 | End: 2025-02-06

## 2025-02-06 RX ADMIN — IOPAMIDOL 100 ML: 755 INJECTION, SOLUTION INTRAVENOUS at 07:26

## 2025-02-25 ENCOUNTER — OFFICE VISIT (OUTPATIENT)
Age: 20
End: 2025-02-25
Payer: COMMERCIAL

## 2025-02-25 VITALS
HEIGHT: 71 IN | OXYGEN SATURATION: 98 % | DIASTOLIC BLOOD PRESSURE: 87 MMHG | RESPIRATION RATE: 15 BRPM | WEIGHT: 209.5 LBS | HEART RATE: 88 BPM | SYSTOLIC BLOOD PRESSURE: 134 MMHG | BODY MASS INDEX: 29.33 KG/M2

## 2025-02-25 DIAGNOSIS — D47.Z2 CASTLEMAN DISEASE (HCC): ICD-10-CM

## 2025-02-25 DIAGNOSIS — J98.59 MEDIASTINAL MASS: Primary | ICD-10-CM

## 2025-02-25 PROCEDURE — 99213 OFFICE O/P EST LOW 20 MIN: CPT | Performed by: STUDENT IN AN ORGANIZED HEALTH CARE EDUCATION/TRAINING PROGRAM

## 2025-02-25 PROCEDURE — 71250 CT THORAX DX C-: CPT | Performed by: STUDENT IN AN ORGANIZED HEALTH CARE EDUCATION/TRAINING PROGRAM

## 2025-02-25 ASSESSMENT — PATIENT HEALTH QUESTIONNAIRE - PHQ9
SUM OF ALL RESPONSES TO PHQ9 QUESTIONS 1 & 2: 0
2. FEELING DOWN, DEPRESSED OR HOPELESS: NOT AT ALL
SUM OF ALL RESPONSES TO PHQ QUESTIONS 1-9: 0
1. LITTLE INTEREST OR PLEASURE IN DOING THINGS: NOT AT ALL

## 2025-02-25 ASSESSMENT — ENCOUNTER SYMPTOMS: SHORTNESS OF BREATH: 0

## 2025-02-25 NOTE — PROGRESS NOTES
Identified patient with two patient identifiers (name and ). Reviewed chart in preparation for visit and have obtained necessary documentation.    Tenzin Wilkinson is a 20 y.o. male  Chief Complaint   Patient presents with    Follow-up     Mediastinal mass     /87 (Site: Left Upper Arm, Position: Sitting, Cuff Size: Large Adult)   Pulse 88   Resp 15   Ht 1.803 m (5' 11\")   Wt 95 kg (209 lb 8 oz)   SpO2 98%   BMI 29.22 kg/m²     1. Have you been to the ER, urgent care clinic since your last visit?  Hospitalized since your last visit?no    2. Have you seen or consulted any other health care providers outside of the [facility] since your last visit?  Include any pap smears or colon screening. no

## 2025-02-25 NOTE — PROGRESS NOTES
Thoracic Surgery Established Patient Return Visit  History and Physical    Chief Complaint:   Chief Complaint   Patient presents with    Follow-up     Mediastinal mass       Patient Active Problem List   Diagnosis    Perineal mass, male    Castleman disease (HCC)    Mediastinal mass       HPI: Tenzin Wilkinson is a 20 y.o. male referred to me by Dr. Figueroa for mediastinal mass with history of Castleman's disease. The pt carries the medical diagnoses of cystic acne and buttock mass that was biopsied in March of this year, leading to his diagnosis of Unicentric Hyaline Vascular Castleman's Disease. He had surveillance PET done in July that showed increased tracer activity in his mediastinum. He had a repeat PET earlier this month that showed persistent tracer activity. He was therefore referred to me for consideration of biopsy of lesion.      The patient reports no systemic symptoms such as fevers, chills, weight loss, or weight gain. No night sweats or family history of similar disease. He reports he overall feels healthy and excited to be turning 20 in a couple days. He is accompanied by his parents.    Today 2/25/25 the patient reports he is doing well. Remains without weight changes, fevers, or night sweats. He had a CT chest with IV contrast 2/6/25 favoring residual thymic tissue. He is accompanied by his parents.    Past Medical History:   Diagnosis Date    Castleman disease (HCC) 03/2024    Perineal mass biopsy    Cystic acne     Pt went through Accutane    Environmental allergies        Past Surgical History:   Procedure Laterality Date    BUTTOCK MASS EXCISION Right 3/11/2024    EXCISION OF SOFT TISSUE MASS RIGHT PERINEUM performed by Monica Lobo MD at [facility] MAIN OR    WISDOM TOOTH EXTRACTION         Current Outpatient Medications   Medication Sig Dispense Refill    Multiple Vitamins-Minerals (THERAPEUTIC MULTIVITAMIN-MINERALS) tablet Take 1 tablet by mouth daily       No current facility-administered

## 2025-07-10 ENCOUNTER — HOSPITAL ENCOUNTER (OUTPATIENT)
Facility: HOSPITAL | Age: 20
Discharge: HOME | End: 2025-07-13
Attending: STUDENT IN AN ORGANIZED HEALTH CARE EDUCATION/TRAINING PROGRAM
Payer: COMMERCIAL

## 2025-07-10 DIAGNOSIS — J98.59 MEDIASTINAL MASS: ICD-10-CM

## 2025-07-10 DIAGNOSIS — D47.Z2 CASTLEMAN'S DISEASE (HCC): ICD-10-CM

## 2025-07-10 PROCEDURE — 2500000003 HC RX 250 WO HCPCS: Performed by: STUDENT IN AN ORGANIZED HEALTH CARE EDUCATION/TRAINING PROGRAM

## 2025-07-10 PROCEDURE — 74177 CT ABD & PELVIS W/CONTRAST: CPT

## 2025-07-10 PROCEDURE — 6360000004 HC RX CONTRAST MEDICATION: Performed by: STUDENT IN AN ORGANIZED HEALTH CARE EDUCATION/TRAINING PROGRAM

## 2025-07-10 RX ORDER — IOPAMIDOL 755 MG/ML
100 INJECTION, SOLUTION INTRAVASCULAR
Status: COMPLETED | OUTPATIENT
Start: 2025-07-10 | End: 2025-07-10

## 2025-07-10 RX ADMIN — IOPAMIDOL 100 ML: 755 INJECTION, SOLUTION INTRAVENOUS at 08:19

## 2025-07-10 RX ADMIN — BARIUM SULFATE 900 ML: 20 SUSPENSION ORAL at 08:20

## 2025-07-11 ENCOUNTER — HOSPITAL ENCOUNTER (OUTPATIENT)
Facility: HOSPITAL | Age: 20
Discharge: HOME | End: 2025-07-14

## 2025-07-11 DIAGNOSIS — D47.Z2 CASTLEMAN'S DISEASE (HCC): ICD-10-CM

## 2025-07-11 LAB
ALBUMIN SERPL-MCNC: 4.1 G/DL (ref 3.5–5)
ALBUMIN/GLOB SERPL: 1.4 (ref 1.1–2.2)
ALP SERPL-CCNC: 64 U/L (ref 45–117)
ALT SERPL-CCNC: 39 U/L (ref 12–78)
ANION GAP SERPL CALC-SCNC: 3 MMOL/L (ref 2–12)
AST SERPL-CCNC: 16 U/L (ref 15–37)
BASOPHILS # BLD: 0.04 K/UL (ref 0–0.1)
BASOPHILS NFR BLD: 0.7 % (ref 0–1)
BILIRUB SERPL-MCNC: 0.3 MG/DL (ref 0.2–1)
BUN SERPL-MCNC: 19 MG/DL (ref 6–20)
BUN/CREAT SERPL: 14 (ref 12–20)
CALCIUM SERPL-MCNC: 9.1 MG/DL (ref 8.5–10.1)
CHLORIDE SERPL-SCNC: 106 MMOL/L (ref 97–108)
CO2 SERPL-SCNC: 27 MMOL/L (ref 21–32)
CREAT SERPL-MCNC: 1.32 MG/DL (ref 0.7–1.3)
CRP SERPL-MCNC: <0.29 MG/DL (ref 0–0.3)
DIFFERENTIAL METHOD BLD: NORMAL
EOSINOPHIL # BLD: 0.12 K/UL (ref 0–0.4)
EOSINOPHIL NFR BLD: 2.2 % (ref 0–7)
ERYTHROCYTE [DISTWIDTH] IN BLOOD BY AUTOMATED COUNT: 13.1 % (ref 11.5–14.5)
FERRITIN SERPL-MCNC: 13 NG/ML (ref 26–388)
GLOBULIN SER CALC-MCNC: 3 G/DL (ref 2–4)
GLUCOSE SERPL-MCNC: 97 MG/DL (ref 65–100)
HCT VFR BLD AUTO: 46.7 % (ref 36.6–50.3)
HGB BLD-MCNC: 15.1 G/DL (ref 12.1–17)
IMM GRANULOCYTES # BLD AUTO: 0.01 K/UL (ref 0–0.04)
IMM GRANULOCYTES NFR BLD AUTO: 0.2 % (ref 0–0.5)
IRON SATN MFR SERPL: 20 % (ref 20–50)
IRON SERPL-MCNC: 83 UG/DL (ref 35–150)
LDH SERPL L TO P-CCNC: 158 U/L (ref 85–241)
LYMPHOCYTES # BLD: 2.34 K/UL (ref 0.8–3.5)
LYMPHOCYTES NFR BLD: 42.5 % (ref 12–49)
MCH RBC QN AUTO: 27.2 PG (ref 26–34)
MCHC RBC AUTO-ENTMCNC: 32.3 G/DL (ref 30–36.5)
MCV RBC AUTO: 84.1 FL (ref 80–99)
MONOCYTES # BLD: 0.39 K/UL (ref 0–1)
MONOCYTES NFR BLD: 7.1 % (ref 5–13)
NEUTS SEG # BLD: 2.6 K/UL (ref 1.8–8)
NEUTS SEG NFR BLD: 47.3 % (ref 32–75)
NRBC # BLD: 0 K/UL (ref 0–0.01)
NRBC BLD-RTO: 0 PER 100 WBC
PLATELET # BLD AUTO: 278 K/UL (ref 150–400)
PMV BLD AUTO: 11.6 FL (ref 8.9–12.9)
POTASSIUM SERPL-SCNC: 4.2 MMOL/L (ref 3.5–5.1)
PROT SERPL-MCNC: 7.1 G/DL (ref 6.4–8.2)
RBC # BLD AUTO: 5.55 M/UL (ref 4.1–5.7)
SODIUM SERPL-SCNC: 136 MMOL/L (ref 136–145)
TIBC SERPL-MCNC: 420 UG/DL (ref 250–450)
WBC # BLD AUTO: 5.5 K/UL (ref 4.1–11.1)

## 2025-07-13 LAB — B2 MICROGLOB SERPL-MCNC: 1.5 MG/L (ref 0.6–2.4)

## 2025-07-15 ENCOUNTER — OFFICE VISIT (OUTPATIENT)
Age: 20
End: 2025-07-15
Payer: COMMERCIAL

## 2025-07-15 VITALS
HEART RATE: 79 BPM | TEMPERATURE: 98.2 F | WEIGHT: 206.6 LBS | DIASTOLIC BLOOD PRESSURE: 79 MMHG | BODY MASS INDEX: 28.92 KG/M2 | SYSTOLIC BLOOD PRESSURE: 126 MMHG | OXYGEN SATURATION: 98 % | HEIGHT: 71 IN

## 2025-07-15 DIAGNOSIS — R71.8 MICROCYTOSIS: ICD-10-CM

## 2025-07-15 DIAGNOSIS — D47.Z2 CASTLEMAN'S DISEASE (HCC): Primary | ICD-10-CM

## 2025-07-15 PROCEDURE — 99214 OFFICE O/P EST MOD 30 MIN: CPT | Performed by: STUDENT IN AN ORGANIZED HEALTH CARE EDUCATION/TRAINING PROGRAM

## 2025-07-15 NOTE — PROGRESS NOTES
[facility]  [address] [facility] [address] [place] [address]  W: [phone] F: [phone]    Reason for Visit:   Tenzin Wilkinson is a 20 y.o. male who is seen in consultation at the request of Dr. Lobo for evaluation of Castleman's Disease.      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 See comments.     Comment     The touch preparation has a homogenous population of small mature   lymphocytes. The histologic section shows an enlarged lymph node with   obliteration of the subcapsular sinuses and a proliferation of large   follicles with involuted germinal centers. Lymphoid follicles have   concentric rings of mantle cell lymphocytes and sclerotic radial blood   vessels. Follicular twinning is also noted. The interfollicular stroma has   a proliferation of high endothelial venules with hyalinized walls.   Lymphocytes are uniformly small mature forms and no Hodgkin Noam-Tracey   cells are identified.     The patient's history of an isolated right groin lump is noted; the   overall findings are compatible with Unicentric Hyaline Vascular   Castleman's Disease.         Initial Presentation  (HPI):     19 year old with relevant medical history of allergies, presents as a referral from general surgery after he underwent excisional  resection of a right perineal soft tissue mass.  He reports a history of a lump in his right groin noted since last June.  CT pelvis with contrast in February 2024 revealed a 3.4 cm right anterior perineal subcutaneous lesions

## 2025-07-15 NOTE — PROGRESS NOTES
Tenzin Wilkinson is a 20 y.o. 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  Pt doing well. No concerns brought up.     1. Have you been to the ER, urgent care clinic since your last visit?  Hospitalized since your last visit? no  2. Have you seen or consulted any other health care providers outside of the [facility] since your last visit?  Include any pap smears or colon screening. no

## 2025-07-16 LAB
ALBUMIN SERPL ELPH-MCNC: 3.9 G/DL (ref 2.9–4.4)
ALBUMIN/GLOB SERPL: 1.4 (ref 0.7–1.7)
ALPHA1 GLOB SERPL ELPH-MCNC: 0.2 G/DL (ref 0–0.4)
ALPHA2 GLOB SERPL ELPH-MCNC: 0.6 G/DL (ref 0.4–1)
B-GLOBULIN SERPL ELPH-MCNC: 1 G/DL (ref 0.7–1.3)
GAMMA GLOB SERPL ELPH-MCNC: 1 G/DL (ref 0.4–1.8)
GLOBULIN SER-MCNC: 2.8 G/DL (ref 2.2–3.9)
IGA SERPL-MCNC: 139 MG/DL (ref 90–386)
IGG SERPL-MCNC: 1052 MG/DL (ref 603–1613)
IGM SERPL-MCNC: 66 MG/DL (ref 20–172)
INTERPRETATION SERPL IEP-IMP: ABNORMAL
KAPPA LC FREE SER-MCNC: 16.9 MG/L (ref 3.3–19.4)
KAPPA LC FREE/LAMBDA FREE SER: 1.97 (ref 0.26–1.65)
LAMBDA LC FREE SERPL-MCNC: 8.6 MG/L (ref 5.7–26.3)
M PROTEIN SERPL ELPH-MCNC: ABNORMAL G/DL
PROT SERPL-MCNC: 6.7 G/DL (ref 6–8.5)

## (undated) DEVICE — CANISTER, RIGID, 3000CC: Brand: MEDLINE INDUSTRIES, INC.

## (undated) DEVICE — YANKAUER,POOLE TIP,STERILE,50/CS: Brand: MEDLINE

## (undated) DEVICE — TUBING, SUCTION, 1/4" X 10', STRAIGHT: Brand: MEDLINE

## (undated) DEVICE — PREMIUM WET SKIN PREP TRAY: Brand: MEDLINE INDUSTRIES, INC.

## (undated) DEVICE — PAD,ABDOMINAL,5"X9",ST,LF,25/BX: Brand: MEDLINE INDUSTRIES, INC.

## (undated) DEVICE — POUCH DRNGE FLX BND INTEGR RAIL CLMP DISP EZ CTCH

## (undated) DEVICE — 1LYRTR 16FR10ML100%SIL UMS SNP: Brand: MEDLINE INDUSTRIES, INC.

## (undated) DEVICE — 4-PORT MANIFOLD: Brand: NEPTUNE 2

## (undated) DEVICE — SPONGE GZ W4XL4IN COT 12 PLY TYP VII WVN C FLD DSGN STERILE

## (undated) DEVICE — SUTURE ABSORBABLE BRAIDED 3-0 SHB 18 IN UD VICRYL + VCPB864D

## (undated) DEVICE — YANKAUER,BULB TIP,W/O VENT,RIGID,STERILE: Brand: MEDLINE

## (undated) DEVICE — DRAPE SURG CYSTO N REINF ST W O FLD PCH STD

## (undated) DEVICE — SOLUTION IRRIG 1000ML STRL H2O USP PLAS POUR BTL